# Patient Record
Sex: FEMALE | Race: WHITE | NOT HISPANIC OR LATINO | URBAN - NONMETROPOLITAN AREA
[De-identification: names, ages, dates, MRNs, and addresses within clinical notes are randomized per-mention and may not be internally consistent; named-entity substitution may affect disease eponyms.]

---

## 2021-04-14 ENCOUNTER — WEB ENCOUNTER (OUTPATIENT)
Dept: URBAN - NONMETROPOLITAN AREA CLINIC 2 | Facility: CLINIC | Age: 76
End: 2021-04-14

## 2021-04-14 ENCOUNTER — ERX REFILL RESPONSE (OUTPATIENT)
Dept: URBAN - NONMETROPOLITAN AREA CLINIC 2 | Facility: CLINIC | Age: 76
End: 2021-04-14

## 2021-04-14 RX ORDER — AMITRIPTYLINE HYDROCHLORIDE 25 MG/1
1 TABLET AT BEDTIME TABLET, FILM COATED ORAL ONCE A DAY
Qty: 30 TABLET | Refills: 3

## 2021-04-14 RX ORDER — AMITRIPTYLINE HYDROCHLORIDE 25 MG/1
TAKE ONE TABLET BY MOUTH EVERY NIGHT AT BEDTIME TABLET, FILM COATED ORAL
Qty: 90 | Refills: 0

## 2021-04-30 ENCOUNTER — OFFICE VISIT (OUTPATIENT)
Dept: URBAN - NONMETROPOLITAN AREA CLINIC 13 | Facility: CLINIC | Age: 76
End: 2021-04-30

## 2021-05-11 ENCOUNTER — WEB ENCOUNTER (OUTPATIENT)
Dept: URBAN - NONMETROPOLITAN AREA CLINIC 2 | Facility: CLINIC | Age: 76
End: 2021-05-11

## 2021-05-11 ENCOUNTER — OFFICE VISIT (OUTPATIENT)
Dept: URBAN - NONMETROPOLITAN AREA CLINIC 2 | Facility: CLINIC | Age: 76
End: 2021-05-11
Payer: MEDICARE

## 2021-05-11 VITALS
SYSTOLIC BLOOD PRESSURE: 148 MMHG | DIASTOLIC BLOOD PRESSURE: 74 MMHG | TEMPERATURE: 97.2 F | WEIGHT: 170 LBS | HEIGHT: 62 IN | BODY MASS INDEX: 31.28 KG/M2 | HEART RATE: 71 BPM

## 2021-05-11 DIAGNOSIS — R19.7 DIARRHEA: ICD-10-CM

## 2021-05-11 DIAGNOSIS — Z12.11 COLON CANCER SCREENING: ICD-10-CM

## 2021-05-11 DIAGNOSIS — K57.90 DIVERTICULAR DISEASE: ICD-10-CM

## 2021-05-11 DIAGNOSIS — K21.9 GERD (GASTROESOPHAGEAL REFLUX DISEASE): ICD-10-CM

## 2021-05-11 PROCEDURE — 99214 OFFICE O/P EST MOD 30 MIN: CPT | Performed by: INTERNAL MEDICINE

## 2021-05-11 RX ORDER — METOPROLOL SUCCINATE 100 MG/1
TAKE 1 TABLET (100 MG) BY ORAL ROUTE ONCE DAILY TABLET, EXTENDED RELEASE ORAL 1
Qty: 0 | Refills: 0 | Status: ON HOLD | COMMUNITY
Start: 1900-01-01

## 2021-05-11 RX ORDER — ASPIRIN 81 MG/1
CHEW 1 TABLET (81 MG) BY ORAL ROUTE ONCE DAILY TABLET, CHEWABLE ORAL 1
Qty: 0 | Refills: 0 | Status: ACTIVE | COMMUNITY
Start: 1900-01-01

## 2021-05-11 RX ORDER — METOPROLOL SUCCINATE 200 MG
1 TABLET TABLET, EXTENDED RELEASE 24 HR ORAL ONCE A DAY
Status: ACTIVE | COMMUNITY

## 2021-05-11 RX ORDER — SACCHAROMYCES BOULARDII 250 MG
TAKE 1 CAPSULE BY ORAL ROUTE DAILY CAPSULE ORAL 1
Qty: 0 | Refills: 0 | Status: ACTIVE | COMMUNITY
Start: 1900-01-01

## 2021-05-11 RX ORDER — AMITRIPTYLINE HYDROCHLORIDE 25 MG/1
1 TABLET AT BEDTIME TABLET, FILM COATED ORAL ONCE A DAY
Qty: 30 TABLET | Refills: 3 | Status: ACTIVE | COMMUNITY

## 2021-05-11 RX ORDER — AMLODIPINE BESYLATE 2.5 MG/1
TAKE 1 TABLET (2.5 MG) BY ORAL ROUTE ONCE DAILY TABLET ORAL 1
Qty: 0 | Refills: 0 | Status: ACTIVE | COMMUNITY
Start: 1900-01-01

## 2021-05-11 RX ORDER — METRONIDAZOLE 7.5 MG/G
1 APPLICATION CREAM TOPICAL TWICE A DAY
Qty: 1 TUBE | Refills: 1 | OUTPATIENT
Start: 2021-05-11 | End: 2021-06-08

## 2021-05-11 RX ORDER — CETIRIZINE HYDROCHLORIDE 10 MG/1
TAKE 1 CAPSULE BY ORAL ROUTE ONCE CAPSULE, LIQUID FILLED ORAL 1
Qty: 0 | Refills: 0 | Status: ACTIVE | COMMUNITY
Start: 1900-01-01

## 2021-05-11 RX ORDER — SPIRONOLACTONE 25 MG/1
TAKE 1 TABLET (25 MG) BY ORAL ROUTE ONCE DAILY TABLET ORAL 1
Qty: 0 | Refills: 0 | Status: ACTIVE | COMMUNITY
Start: 1900-01-01

## 2021-05-11 RX ORDER — ROSUVASTATIN CALCIUM 20 MG
TAKE 1 TABLET (20 MG) BY ORAL ROUTE ONCE DAILY TABLET ORAL 1
Qty: 0 | Refills: 0 | Status: ACTIVE | COMMUNITY
Start: 1900-01-01

## 2021-05-11 RX ORDER — BENAZEPRIL HYDROCHLORIDE 40 MG/1
TAKE 1 TABLET (40 MG) BY ORAL ROUTE ONCE DAILY TABLET, FILM COATED ORAL 1
Qty: 0 | Refills: 0 | Status: ACTIVE | COMMUNITY
Start: 1900-01-01

## 2021-05-11 NOTE — HPI-TODAY'S VISIT:
Mrs. Clark presents for evaluation of perineal rash.  A month ago she developed an acute viral enteritis with nausea vomiting diarrhea.  She had some perineal irritation and vaginal yeast infection.  She was given an ointment by her gynecologist for the yeast infection and was told also to rub this on her perineum and perirectal area.  Over the past 2 weeks she has had progressive ulcerations of her perirectal area with bleeding.  This is sore to the touch and erythematous.  Her bowels have improved, she continues to take her Florastor daily and amitriptyline 25 mg nightly with a history of IBS-D.  Her diarrhea has resolved but this perineal inflammation and bleeding is persistent.  MB

## 2021-07-02 ENCOUNTER — WEB ENCOUNTER (OUTPATIENT)
Dept: URBAN - NONMETROPOLITAN AREA CLINIC 13 | Facility: CLINIC | Age: 76
End: 2021-07-02

## 2021-07-02 ENCOUNTER — OFFICE VISIT (OUTPATIENT)
Dept: URBAN - NONMETROPOLITAN AREA CLINIC 13 | Facility: CLINIC | Age: 76
End: 2021-07-02
Payer: MEDICARE

## 2021-07-02 DIAGNOSIS — K57.90 DIVERTICULAR DISEASE: ICD-10-CM

## 2021-07-02 DIAGNOSIS — R19.7 DIARRHEA: ICD-10-CM

## 2021-07-02 DIAGNOSIS — K21.9 GERD (GASTROESOPHAGEAL REFLUX DISEASE): ICD-10-CM

## 2021-07-02 DIAGNOSIS — Z12.11 COLON CANCER SCREENING: ICD-10-CM

## 2021-07-02 PROCEDURE — 99214 OFFICE O/P EST MOD 30 MIN: CPT | Performed by: INTERNAL MEDICINE

## 2021-07-02 RX ORDER — METOPROLOL SUCCINATE 200 MG
1 TABLET TABLET, EXTENDED RELEASE 24 HR ORAL ONCE A DAY
Status: ACTIVE | COMMUNITY

## 2021-07-02 RX ORDER — CETIRIZINE HYDROCHLORIDE 10 MG/1
TAKE 1 CAPSULE BY ORAL ROUTE ONCE CAPSULE, LIQUID FILLED ORAL 1
Qty: 0 | Refills: 0 | Status: ACTIVE | COMMUNITY
Start: 1900-01-01

## 2021-07-02 RX ORDER — ASPIRIN 81 MG/1
CHEW 1 TABLET (81 MG) BY ORAL ROUTE ONCE DAILY TABLET, CHEWABLE ORAL 1
Qty: 0 | Refills: 0 | Status: ACTIVE | COMMUNITY
Start: 1900-01-01

## 2021-07-02 RX ORDER — AMLODIPINE BESYLATE 2.5 MG/1
TAKE 1 TABLET (2.5 MG) BY ORAL ROUTE ONCE DAILY TABLET ORAL 1
Qty: 0 | Refills: 0 | Status: ACTIVE | COMMUNITY
Start: 1900-01-01

## 2021-07-02 RX ORDER — AMITRIPTYLINE HYDROCHLORIDE 25 MG/1
1 TABLET AT BEDTIME TABLET, FILM COATED ORAL ONCE A DAY
Qty: 30 TABLET | Refills: 11

## 2021-07-02 RX ORDER — SPIRONOLACTONE 25 MG/1
TAKE 1 TABLET (25 MG) BY ORAL ROUTE ONCE DAILY TABLET ORAL 1
Qty: 0 | Refills: 0 | Status: ACTIVE | COMMUNITY
Start: 1900-01-01

## 2021-07-02 RX ORDER — METOPROLOL SUCCINATE 100 MG/1
TAKE 1 TABLET (100 MG) BY ORAL ROUTE ONCE DAILY TABLET, EXTENDED RELEASE ORAL 1
Qty: 0 | Refills: 0 | Status: ON HOLD | COMMUNITY
Start: 1900-01-01

## 2021-07-02 RX ORDER — DICYCLOMINE HYDROCHLORIDE 10 MG/1
1 CAPSULE CAPSULE ORAL
Qty: 90 CAPSULE | Refills: 6 | OUTPATIENT
Start: 2021-07-02 | End: 2022-01-27

## 2021-07-02 RX ORDER — ROSUVASTATIN CALCIUM 20 MG
TAKE 1 TABLET (20 MG) BY ORAL ROUTE ONCE DAILY TABLET ORAL 1
Qty: 0 | Refills: 0 | Status: ACTIVE | COMMUNITY
Start: 1900-01-01

## 2021-07-02 RX ORDER — AMITRIPTYLINE HYDROCHLORIDE 25 MG/1
1 TABLET AT BEDTIME TABLET, FILM COATED ORAL ONCE A DAY
Qty: 30 TABLET | Refills: 3 | Status: ACTIVE | COMMUNITY

## 2021-07-02 RX ORDER — BENAZEPRIL HYDROCHLORIDE 40 MG/1
TAKE 1 TABLET (40 MG) BY ORAL ROUTE ONCE DAILY TABLET, FILM COATED ORAL 1
Qty: 0 | Refills: 0 | Status: ACTIVE | COMMUNITY
Start: 1900-01-01

## 2021-07-02 RX ORDER — SACCHAROMYCES BOULARDII 250 MG
TAKE 1 CAPSULE BY ORAL ROUTE DAILY CAPSULE ORAL 1
Qty: 0 | Refills: 0 | Status: ACTIVE | COMMUNITY
Start: 1900-01-01

## 2021-07-02 NOTE — HPI-TODAY'S VISIT:
Mrs. Clark presents for evaluation of perineal rash.  A month ago she developed an acute viral enteritis with nausea vomiting diarrhea.  She had some perineal irritation and vaginal yeast infection.  She was given an ointment by her gynecologist for the yeast infection and was told also to rub this on her perineum and perirectal area.  Over the past 2 weeks she has had progressive ulcerations of her perirectal area with bleeding.  This is sore to the touch and erythematous.  Her bowels have improved, she continues to take her Florastor daily and amitriptyline 25 mg nightly with a history of IBS-D.  Her diarrhea has resolved but this perineal inflammation and bleeding is persistent.  MB 7-2-21 The patient presents today for follow-up of her diarrhea and rash.  Since her last visit, her rash has improved.  Her diarrhea still continues about once every 5 days.  She will wake up almost every morning, and have a normal bowel movement.  On her bad days, she will then have a urgent diarrhea that last up to 10 times.  She will then take 2 Imodium, and then she will usually have normal bowel movements for the next 5 days.  She can usually feel when this sensation is going to come on.  She is taking the amitriptyline at night.  She has never tried dicyclomine.  At this point, we have discussed taking 1-2 dicyclomine every morning to see if this does not help control her diarrhea.  She likely has irritable bowel syndrome with diarrhea in that she did have negative random colon biopsies done in 2018.  She denies any blood in her stool or weight loss.  Overall, she is feeling better today.

## 2021-11-05 ENCOUNTER — OFFICE VISIT (OUTPATIENT)
Dept: URBAN - NONMETROPOLITAN AREA CLINIC 13 | Facility: CLINIC | Age: 76
End: 2021-11-05
Payer: MEDICARE

## 2021-11-05 VITALS
WEIGHT: 174 LBS | BODY MASS INDEX: 32.02 KG/M2 | HEIGHT: 62 IN | DIASTOLIC BLOOD PRESSURE: 83 MMHG | HEART RATE: 60 BPM | SYSTOLIC BLOOD PRESSURE: 154 MMHG

## 2021-11-05 DIAGNOSIS — K57.90 DIVERTICULAR DISEASE: ICD-10-CM

## 2021-11-05 DIAGNOSIS — Z12.11 COLON CANCER SCREENING: ICD-10-CM

## 2021-11-05 DIAGNOSIS — K21.9 GERD (GASTROESOPHAGEAL REFLUX DISEASE): ICD-10-CM

## 2021-11-05 DIAGNOSIS — R19.7 DIARRHEA: ICD-10-CM

## 2021-11-05 PROCEDURE — 99214 OFFICE O/P EST MOD 30 MIN: CPT | Performed by: INTERNAL MEDICINE

## 2021-11-05 RX ORDER — BENAZEPRIL HYDROCHLORIDE 40 MG/1
TAKE 1 TABLET (40 MG) BY ORAL ROUTE ONCE DAILY TABLET, FILM COATED ORAL 1
Qty: 0 | Refills: 0 | Status: ACTIVE | COMMUNITY
Start: 1900-01-01

## 2021-11-05 RX ORDER — AMITRIPTYLINE HYDROCHLORIDE 10 MG/1
1 TABLET AT BEDTIME TABLET, FILM COATED ORAL ONCE A DAY
Qty: 90 TABLET | Refills: 3 | OUTPATIENT
Start: 2021-11-05

## 2021-11-05 RX ORDER — ROSUVASTATIN CALCIUM 20 MG
TAKE 1 TABLET (20 MG) BY ORAL ROUTE ONCE DAILY TABLET ORAL 1
Qty: 0 | Refills: 0 | Status: ACTIVE | COMMUNITY
Start: 1900-01-01

## 2021-11-05 RX ORDER — DICYCLOMINE HYDROCHLORIDE 10 MG/1
1 CAPSULE CAPSULE ORAL
Qty: 90 CAPSULE | Refills: 6 | Status: ACTIVE | COMMUNITY
Start: 2021-07-02 | End: 2022-01-27

## 2021-11-05 RX ORDER — AMLODIPINE BESYLATE 2.5 MG/1
TAKE 1 TABLET (2.5 MG) BY ORAL ROUTE ONCE DAILY TABLET ORAL 1
Qty: 0 | Refills: 0 | Status: ACTIVE | COMMUNITY
Start: 1900-01-01

## 2021-11-05 RX ORDER — CETIRIZINE HYDROCHLORIDE 10 MG/1
TAKE 1 CAPSULE BY ORAL ROUTE ONCE CAPSULE, LIQUID FILLED ORAL 1
Qty: 0 | Refills: 0 | Status: ACTIVE | COMMUNITY
Start: 1900-01-01

## 2021-11-05 RX ORDER — AMITRIPTYLINE HYDROCHLORIDE 25 MG/1
1 TABLET AT BEDTIME TABLET, FILM COATED ORAL ONCE A DAY
Qty: 30 TABLET | Refills: 11 | Status: ACTIVE | COMMUNITY

## 2021-11-05 RX ORDER — ASPIRIN 81 MG/1
CHEW 1 TABLET (81 MG) BY ORAL ROUTE ONCE DAILY TABLET, CHEWABLE ORAL 1
Qty: 0 | Refills: 0 | Status: ACTIVE | COMMUNITY
Start: 1900-01-01

## 2021-11-05 RX ORDER — METOPROLOL SUCCINATE 200 MG
1 TABLET TABLET, EXTENDED RELEASE 24 HR ORAL ONCE A DAY
Status: ACTIVE | COMMUNITY

## 2021-11-05 RX ORDER — METOPROLOL SUCCINATE 100 MG/1
TAKE 1 TABLET (100 MG) BY ORAL ROUTE ONCE DAILY TABLET, EXTENDED RELEASE ORAL 1
Qty: 0 | Refills: 0 | Status: ON HOLD | COMMUNITY
Start: 1900-01-01

## 2021-11-05 RX ORDER — SACCHAROMYCES BOULARDII 250 MG
TAKE 1 CAPSULE BY ORAL ROUTE DAILY CAPSULE ORAL 1
Qty: 0 | Refills: 0 | Status: ACTIVE | COMMUNITY
Start: 1900-01-01

## 2021-11-05 RX ORDER — SPIRONOLACTONE 25 MG/1
TAKE 1 TABLET (25 MG) BY ORAL ROUTE ONCE DAILY TABLET ORAL 1
Qty: 0 | Refills: 0 | Status: ACTIVE | COMMUNITY
Start: 1900-01-01

## 2021-11-05 NOTE — HPI-TODAY'S VISIT:
Mrs. Clark presents for evaluation of perineal rash.  A month ago she developed an acute viral enteritis with nausea vomiting diarrhea.  She had some perineal irritation and vaginal yeast infection.  She was given an ointment by her gynecologist for the yeast infection and was told also to rub this on her perineum and perirectal area.  Over the past 2 weeks she has had progressive ulcerations of her perirectal area with bleeding.  This is sore to the touch and erythematous.  Her bowels have improved, she continues to take her Florastor daily and amitriptyline 25 mg nightly with a history of IBS-D.  Her diarrhea has resolved but this perineal inflammation and bleeding is persistent.  MB 7-2-21 The patient presents today for follow-up of her diarrhea and rash.  Since her last visit, her rash has improved.  Her diarrhea still continues about once every 5 days.  She will wake up almost every morning, and have a normal bowel movement.  On her bad days, she will then have a urgent diarrhea that last up to 10 times.  She will then take 2 Imodium, and then she will usually have normal bowel movements for the next 5 days.  She can usually feel when this sensation is going to come on.  She is taking the amitriptyline at night.  She has never tried dicyclomine.  At this point, we have discussed taking 1-2 dicyclomine every morning to see if this does not help control her diarrhea.  She likely has irritable bowel syndrome with diarrhea in that she did have negative random colon biopsies done in 2018.  She denies any blood in her stool or weight loss.  Overall, she is feeling better today. 11/5/2021 The patient presents today for follow-up of her diarrhea.  She has improved after taking dicyclomine 1:59 in the morning.  She is also on amitriptyline at night.  She has not added in Metamucil at this time.  She continues to have occasional episodes of explosive diarrhea after being more constipated for about 10 days.  At this point, I want her to continue her dicyclomine in the morning her amitriptyline at night, and add Metamucil at night as well.  Overall, she is doing quite well.  She is off medications for her reflux.

## 2021-11-08 ENCOUNTER — WEB ENCOUNTER (OUTPATIENT)
Dept: URBAN - NONMETROPOLITAN AREA CLINIC 2 | Facility: CLINIC | Age: 76
End: 2021-11-08

## 2021-11-08 RX ORDER — AMITRIPTYLINE HYDROCHLORIDE 25 MG/1
1 TABLET AT BEDTIME TABLET, FILM COATED ORAL ONCE A DAY
Qty: 90 TABLET | Refills: 3

## 2022-05-06 ENCOUNTER — OFFICE VISIT (OUTPATIENT)
Dept: URBAN - NONMETROPOLITAN AREA CLINIC 2 | Facility: CLINIC | Age: 77
End: 2022-05-06
Payer: MEDICARE

## 2022-05-06 DIAGNOSIS — K57.90 DIVERTICULAR DISEASE: ICD-10-CM

## 2022-05-06 DIAGNOSIS — Z12.11 COLON CANCER SCREENING: ICD-10-CM

## 2022-05-06 DIAGNOSIS — R19.7 DIARRHEA: ICD-10-CM

## 2022-05-06 DIAGNOSIS — K21.9 GERD (GASTROESOPHAGEAL REFLUX DISEASE): ICD-10-CM

## 2022-05-06 PROCEDURE — 99213 OFFICE O/P EST LOW 20 MIN: CPT | Performed by: INTERNAL MEDICINE

## 2022-05-06 RX ORDER — SACCHAROMYCES BOULARDII 250 MG
TAKE 1 CAPSULE BY ORAL ROUTE DAILY CAPSULE ORAL 1
Qty: 0 | Refills: 0 | Status: ACTIVE | COMMUNITY
Start: 1900-01-01

## 2022-05-06 RX ORDER — BENAZEPRIL HYDROCHLORIDE 40 MG/1
TAKE 1 TABLET (40 MG) BY ORAL ROUTE ONCE DAILY TABLET, FILM COATED ORAL 1
Qty: 0 | Refills: 0 | Status: ACTIVE | COMMUNITY
Start: 1900-01-01

## 2022-05-06 RX ORDER — CETIRIZINE HYDROCHLORIDE 10 MG/1
TAKE 1 CAPSULE BY ORAL ROUTE ONCE CAPSULE, LIQUID FILLED ORAL 1
Qty: 0 | Refills: 0 | Status: ACTIVE | COMMUNITY
Start: 1900-01-01

## 2022-05-06 RX ORDER — METOPROLOL SUCCINATE 200 MG
1 TABLET TABLET, EXTENDED RELEASE 24 HR ORAL ONCE A DAY
Status: ACTIVE | COMMUNITY

## 2022-05-06 RX ORDER — AMITRIPTYLINE HYDROCHLORIDE 10 MG/1
1 TABLET AT BEDTIME TABLET, FILM COATED ORAL ONCE A DAY
Qty: 90 TABLET | Refills: 3 | Status: ACTIVE | COMMUNITY
Start: 2021-11-05

## 2022-05-06 RX ORDER — SPIRONOLACTONE 50 MG/1
TAKE 1 TABLET (25 MG) BY ORAL ROUTE ONCE DAILY TABLET, FILM COATED ORAL 1
Qty: 0 | Refills: 0 | Status: ACTIVE | COMMUNITY
Start: 1900-01-01

## 2022-05-06 RX ORDER — ASPIRIN 81 MG/1
CHEW 1 TABLET (81 MG) BY ORAL ROUTE ONCE DAILY TABLET, CHEWABLE ORAL 1
Qty: 0 | Refills: 0 | Status: ACTIVE | COMMUNITY
Start: 1900-01-01

## 2022-05-06 RX ORDER — AMITRIPTYLINE HYDROCHLORIDE 25 MG/1
1 TABLET AT BEDTIME TABLET, FILM COATED ORAL ONCE A DAY
Qty: 90 TABLET | Refills: 3 | Status: ACTIVE | COMMUNITY

## 2022-05-06 RX ORDER — ROSUVASTATIN CALCIUM 20 MG
TAKE 1 TABLET (20 MG) BY ORAL ROUTE ONCE DAILY TABLET ORAL 1
Qty: 0 | Refills: 0 | Status: ACTIVE | COMMUNITY
Start: 1900-01-01

## 2022-05-06 RX ORDER — AMLODIPINE BESYLATE 2.5 MG/1
TAKE 1 TABLET (2.5 MG) BY ORAL ROUTE ONCE DAILY TABLET ORAL 1
Qty: 0 | Refills: 0 | Status: ACTIVE | COMMUNITY
Start: 1900-01-01

## 2022-05-06 NOTE — HPI-TODAY'S VISIT:
Mrs. Clark presents for evaluation of perineal rash.  A month ago she developed an acute viral enteritis with nausea vomiting diarrhea.  She had some perineal irritation and vaginal yeast infection.  She was given an ointment by her gynecologist for the yeast infection and was told also to rub this on her perineum and perirectal area.  Over the past 2 weeks she has had progressive ulcerations of her perirectal area with bleeding.  This is sore to the touch and erythematous.  Her bowels have improved, she continues to take her Florastor daily and amitriptyline 25 mg nightly with a history of IBS-D.  Her diarrhea has resolved but this perineal inflammation and bleeding is persistent.  MB 7-2-21 The patient presents today for follow-up of her diarrhea and rash.  Since her last visit, her rash has improved.  Her diarrhea still continues about once every 5 days.  She will wake up almost every morning, and have a normal bowel movement.  On her bad days, she will then have a urgent diarrhea that last up to 10 times.  She will then take 2 Imodium, and then she will usually have normal bowel movements for the next 5 days.  She can usually feel when this sensation is going to come on.  She is taking the amitriptyline at night.  She has never tried dicyclomine.  At this point, we have discussed taking 1-2 dicyclomine every morning to see if this does not help control her diarrhea.  She likely has irritable bowel syndrome with diarrhea in that she did have negative random colon biopsies done in 2018.  She denies any blood in her stool or weight loss.  Overall, she is feeling better today. 11/5/2021 The patient presents today for follow-up of her diarrhea.  She has improved after taking dicyclomine 1:59 in the morning.  She is also on amitriptyline at night.  She has not added in Metamucil at this time.  She continues to have occasional episodes of explosive diarrhea after being more constipated for about 10 days.  At this point, I want her to continue her dicyclomine in the morning her amitriptyline at night, and add Metamucil at night as well.  Overall, she is doing quite well.  She is off medications for her reflux. 5/6/2022 The patient presents today for follow-up of her IBS with diarrhea.  Since her last visit, she has been doing well.  She takes dicyclomine twice a day and amitriptyline at night.  She also uses Imodium as needed.  On this regimen, her bowels are well controlled.  She has not added in Metamucil, but she is going to try to do this as well.  She is due for repeat colonoscopy next year given her family history of colon cancer.  We will see her back in the office in 1 year.

## 2022-07-15 ENCOUNTER — TELEPHONE ENCOUNTER (OUTPATIENT)
Dept: URBAN - NONMETROPOLITAN AREA CLINIC 2 | Facility: CLINIC | Age: 77
End: 2022-07-15

## 2022-07-15 RX ORDER — DICYCLOMINE HYDROCHLORIDE 10 MG/1
TAKE 1 CAPSULE BY ORAL ROUTE 2 TIMES A DAY  BEFORE MEALS AS NEEDED FOR DIARRHEA/URGENCY CAPSULE ORAL THREE TIMES A DAY
Qty: 60 CAPSULE | Refills: 11
Start: 2021-07-02 | End: 2023-07-13

## 2022-08-05 ENCOUNTER — OFFICE VISIT (OUTPATIENT)
Dept: URBAN - NONMETROPOLITAN AREA CLINIC 2 | Facility: CLINIC | Age: 77
End: 2022-08-05
Payer: MEDICARE

## 2022-08-05 ENCOUNTER — LAB OUTSIDE AN ENCOUNTER (OUTPATIENT)
Dept: URBAN - NONMETROPOLITAN AREA CLINIC 2 | Facility: CLINIC | Age: 77
End: 2022-08-05

## 2022-08-05 VITALS
BODY MASS INDEX: 31.83 KG/M2 | DIASTOLIC BLOOD PRESSURE: 94 MMHG | TEMPERATURE: 97.2 F | HEIGHT: 62 IN | SYSTOLIC BLOOD PRESSURE: 145 MMHG | HEART RATE: 91 BPM | WEIGHT: 173 LBS

## 2022-08-05 DIAGNOSIS — R19.7 DIARRHEA: ICD-10-CM

## 2022-08-05 DIAGNOSIS — R13.19 ESOPHAGEAL DYSPHAGIA: ICD-10-CM

## 2022-08-05 DIAGNOSIS — Z12.11 COLON CANCER SCREENING: ICD-10-CM

## 2022-08-05 DIAGNOSIS — K21.9 GERD (GASTROESOPHAGEAL REFLUX DISEASE): ICD-10-CM

## 2022-08-05 DIAGNOSIS — K57.90 DIVERTICULAR DISEASE: ICD-10-CM

## 2022-08-05 PROCEDURE — 99214 OFFICE O/P EST MOD 30 MIN: CPT | Performed by: INTERNAL MEDICINE

## 2022-08-05 RX ORDER — DICYCLOMINE HYDROCHLORIDE 10 MG/1
1 CAPSULE CAPSULE ORAL THREE TIMES A DAY
Qty: 270 CAPSULE | Refills: 6 | OUTPATIENT
Start: 2022-08-05 | End: 2024-04-26

## 2022-08-05 RX ORDER — CETIRIZINE HYDROCHLORIDE 10 MG/1
TAKE 1 CAPSULE BY ORAL ROUTE ONCE CAPSULE, LIQUID FILLED ORAL 1
Qty: 0 | Refills: 0 | Status: ACTIVE | COMMUNITY
Start: 1900-01-01

## 2022-08-05 RX ORDER — PANTOPRAZOLE SODIUM 40 MG/1
1 TABLET TABLET, DELAYED RELEASE ORAL ONCE A DAY
Qty: 90 TABLET | Refills: 3 | OUTPATIENT
Start: 2022-08-05

## 2022-08-05 RX ORDER — BENAZEPRIL HYDROCHLORIDE 40 MG/1
TAKE 1 TABLET (40 MG) BY ORAL ROUTE ONCE DAILY TABLET, FILM COATED ORAL 1
Qty: 0 | Refills: 0 | Status: ACTIVE | COMMUNITY
Start: 1900-01-01

## 2022-08-05 RX ORDER — ROSUVASTATIN CALCIUM 20 MG
TAKE 1 TABLET (20 MG) BY ORAL ROUTE ONCE DAILY TABLET ORAL 1
Qty: 0 | Refills: 0 | Status: ACTIVE | COMMUNITY
Start: 1900-01-01

## 2022-08-05 RX ORDER — METOPROLOL SUCCINATE 200 MG
1 TABLET TABLET, EXTENDED RELEASE 24 HR ORAL ONCE A DAY
Status: ACTIVE | COMMUNITY

## 2022-08-05 RX ORDER — AMLODIPINE BESYLATE 2.5 MG/1
TAKE 1 TABLET (2.5 MG) BY ORAL ROUTE ONCE DAILY TABLET ORAL 1
Qty: 0 | Refills: 0 | Status: ACTIVE | COMMUNITY
Start: 1900-01-01

## 2022-08-05 RX ORDER — SPIRONOLACTONE 50 MG/1
TAKE 1 TABLET (25 MG) BY ORAL ROUTE ONCE DAILY TABLET, FILM COATED ORAL 1
Qty: 0 | Refills: 0 | Status: ACTIVE | COMMUNITY
Start: 1900-01-01

## 2022-08-05 RX ORDER — DICYCLOMINE HYDROCHLORIDE 10 MG/1
TAKE 1 CAPSULE BY ORAL ROUTE 2 TIMES A DAY  BEFORE MEALS AS NEEDED FOR DIARRHEA/URGENCY CAPSULE ORAL THREE TIMES A DAY
Qty: 60 CAPSULE | Refills: 11 | Status: ACTIVE | COMMUNITY
Start: 2021-07-02 | End: 2023-07-13

## 2022-08-05 NOTE — HPI-TODAY'S VISIT:
Mrs. Clark presents for evaluation of perineal rash.  A month ago she developed an acute viral enteritis with nausea vomiting diarrhea.  She had some perineal irritation and vaginal yeast infection.  She was given an ointment by her gynecologist for the yeast infection and was told also to rub this on her perineum and perirectal area.  Over the past 2 weeks she has had progressive ulcerations of her perirectal area with bleeding.  This is sore to the touch and erythematous.  Her bowels have improved, she continues to take her Florastor daily and amitriptyline 25 mg nightly with a history of IBS-D.  Her diarrhea has resolved but this perineal inflammation and bleeding is persistent.  MB 7-2-21 The patient presents today for follow-up of her diarrhea and rash.  Since her last visit, her rash has improved.  Her diarrhea still continues about once every 5 days.  She will wake up almost every morning, and have a normal bowel movement.  On her bad days, she will then have a urgent diarrhea that last up to 10 times.  She will then take 2 Imodium, and then she will usually have normal bowel movements for the next 5 days.  She can usually feel when this sensation is going to come on.  She is taking the amitriptyline at night.  She has never tried dicyclomine.  At this point, we have discussed taking 1-2 dicyclomine every morning to see if this does not help control her diarrhea.  She likely has irritable bowel syndrome with diarrhea in that she did have negative random colon biopsies done in 2018.  She denies any blood in her stool or weight loss.  Overall, she is feeling better today. 11/5/2021 The patient presents today for follow-up of her diarrhea.  She has improved after taking dicyclomine 1:59 in the morning.  She is also on amitriptyline at night.  She has not added in Metamucil at this time.  She continues to have occasional episodes of explosive diarrhea after being more constipated for about 10 days.  At this point, I want her to continue her dicyclomine in the morning her amitriptyline at night, and add Metamucil at night as well.  Overall, she is doing quite well.  She is off medications for her reflux. 5/6/2022 The patient presents today for follow-up of her IBS with diarrhea.  Since her last visit, she has been doing well.  She takes dicyclomine twice a day and amitriptyline at night.  She also uses Imodium as needed.  On this regimen, her bowels are well controlled.  She has not added in Metamucil, but she is going to try to do this as well.  She is due for repeat colonoscopy next year given her family history of colon cancer.  We will see her back in the office in 1 year. 8/5/2022 the patient presents today for follow-up of her IBS with diarrhea.  Her main issue today, is that she has new onset dysphagia.  She states about 10 years ago, she had a similar problem.  She actually had a food bolus, and Dr. Centeno did an EGD with dilation and food bolus removal.  She has been taking 1 over-the-counter Prevacid since that time.  She has been having worsening symptoms of her reflux.  She feels burning and acid coming up into her esophagus throughout the day and at night.  Her diarrhea has been worse as well.  When she got her last prescription for dicyclomine, it was only for once a day.  She has had to take Imodium several times this week where she usually only has to take it once a month.  She did add in the Metamucil, but she is unsure if this is helped significantly.  Today, we have discussed switching her PPI to pantoprazole 40 mg daily.  We are going to schedule her for an EGD with dilation.  I want her to go back on her dicyclomine twice a day.  We will see her back in the office after her EGD for further evaluation.

## 2023-01-23 ENCOUNTER — ERX REFILL RESPONSE (OUTPATIENT)
Dept: URBAN - NONMETROPOLITAN AREA CLINIC 2 | Facility: CLINIC | Age: 78
End: 2023-01-23

## 2023-01-23 RX ORDER — AMITRIPTYLINE HYDROCHLORIDE 25 MG/1
TAKE ONE TABLET BY MOUTH EVERY NIGHT AT BEDTIME TABLET, FILM COATED ORAL
Qty: 90 TABLET | Refills: 0 | OUTPATIENT

## 2023-01-23 RX ORDER — AMITRIPTYLINE HYDROCHLORIDE 25 MG/1
TAKE ONE TABLET BY MOUTH EVERY NIGHT AT BEDTIME TABLET, FILM COATED ORAL
Qty: 90 TABLET | Refills: 3 | OUTPATIENT

## 2023-05-12 ENCOUNTER — OFFICE VISIT (OUTPATIENT)
Dept: URBAN - NONMETROPOLITAN AREA CLINIC 13 | Facility: CLINIC | Age: 78
End: 2023-05-12
Payer: MEDICARE

## 2023-05-12 ENCOUNTER — LAB OUTSIDE AN ENCOUNTER (OUTPATIENT)
Dept: URBAN - NONMETROPOLITAN AREA CLINIC 13 | Facility: CLINIC | Age: 78
End: 2023-05-12

## 2023-05-12 VITALS
HEIGHT: 62 IN | BODY MASS INDEX: 31.28 KG/M2 | HEART RATE: 88 BPM | SYSTOLIC BLOOD PRESSURE: 137 MMHG | WEIGHT: 170 LBS | DIASTOLIC BLOOD PRESSURE: 90 MMHG

## 2023-05-12 DIAGNOSIS — R13.19 ESOPHAGEAL DYSPHAGIA: ICD-10-CM

## 2023-05-12 DIAGNOSIS — K21.9 GERD (GASTROESOPHAGEAL REFLUX DISEASE): ICD-10-CM

## 2023-05-12 DIAGNOSIS — R19.7 DIARRHEA: ICD-10-CM

## 2023-05-12 DIAGNOSIS — Z12.11 COLON CANCER SCREENING: ICD-10-CM

## 2023-05-12 PROBLEM — 40890009: Status: ACTIVE | Noted: 2022-08-05

## 2023-05-12 PROCEDURE — 99214 OFFICE O/P EST MOD 30 MIN: CPT

## 2023-05-12 RX ORDER — DICYCLOMINE HYDROCHLORIDE 10 MG/1
1 CAPSULE CAPSULE ORAL THREE TIMES A DAY
Qty: 270 CAPSULE | Refills: 6 | OUTPATIENT

## 2023-05-12 RX ORDER — ROSUVASTATIN CALCIUM 20 MG
TAKE 1 TABLET (20 MG) BY ORAL ROUTE ONCE DAILY TABLET ORAL 1
Qty: 0 | Refills: 0 | Status: ACTIVE | COMMUNITY
Start: 1900-01-01

## 2023-05-12 RX ORDER — BENAZEPRIL HYDROCHLORIDE 40 MG/1
TAKE 1 TABLET (40 MG) BY ORAL ROUTE ONCE DAILY TABLET, FILM COATED ORAL 1
Qty: 0 | Refills: 0 | Status: ACTIVE | COMMUNITY
Start: 1900-01-01

## 2023-05-12 RX ORDER — CETIRIZINE HYDROCHLORIDE 10 MG/1
TAKE 1 CAPSULE BY ORAL ROUTE ONCE CAPSULE, LIQUID FILLED ORAL 1
Qty: 0 | Refills: 0 | Status: ACTIVE | COMMUNITY
Start: 1900-01-01

## 2023-05-12 RX ORDER — DICYCLOMINE HYDROCHLORIDE 10 MG/1
1 CAPSULE CAPSULE ORAL THREE TIMES A DAY
Qty: 270 CAPSULE | Refills: 6 | Status: ACTIVE | COMMUNITY
Start: 2022-08-05 | End: 2024-04-26

## 2023-05-12 RX ORDER — PANTOPRAZOLE SODIUM 40 MG/1
1 TABLET TABLET, DELAYED RELEASE ORAL ONCE A DAY
Qty: 90 TABLET | Refills: 3 | OUTPATIENT

## 2023-05-12 RX ORDER — PANTOPRAZOLE SODIUM 40 MG/1
1 TABLET TABLET, DELAYED RELEASE ORAL ONCE A DAY
Qty: 90 TABLET | Refills: 3 | Status: ACTIVE | COMMUNITY
Start: 2022-08-05

## 2023-05-12 RX ORDER — AMITRIPTYLINE HYDROCHLORIDE 25 MG/1
TAKE ONE TABLET BY MOUTH EVERY NIGHT AT BEDTIME TABLET, FILM COATED ORAL
Qty: 90 TABLET | Refills: 3 | Status: ACTIVE | COMMUNITY

## 2023-05-12 RX ORDER — METOPROLOL SUCCINATE 200 MG
1 TABLET TABLET, EXTENDED RELEASE 24 HR ORAL ONCE A DAY
Status: ACTIVE | COMMUNITY

## 2023-05-12 RX ORDER — DICYCLOMINE HYDROCHLORIDE 10 MG/1
TAKE 1 CAPSULE BY ORAL ROUTE 2 TIMES A DAY  BEFORE MEALS AS NEEDED FOR DIARRHEA/URGENCY CAPSULE ORAL THREE TIMES A DAY
Qty: 60 CAPSULE | Refills: 11 | Status: ACTIVE | COMMUNITY
Start: 2021-07-02 | End: 2023-07-13

## 2023-05-12 RX ORDER — POLYETHYLENE GLYCOL 3350, SODIUM SULFATE, SODIUM CHLORIDE, POTASSIUM CHLORIDE, ASCORBIC ACID, SODIUM ASCORBATE 140-9-5.2G
1000 ML KIT ORAL ONCE
Qty: 1 KIT | Refills: 0 | OUTPATIENT
Start: 2023-05-12 | End: 2023-05-13

## 2023-05-12 RX ORDER — AMLODIPINE BESYLATE 2.5 MG/1
TAKE 1 TABLET (2.5 MG) BY ORAL ROUTE ONCE DAILY TABLET ORAL 1
Qty: 0 | Refills: 0 | Status: ACTIVE | COMMUNITY
Start: 1900-01-01

## 2023-05-12 RX ORDER — SPIRONOLACTONE 50 MG/1
TAKE 1 TABLET (25 MG) BY ORAL ROUTE ONCE DAILY TABLET, FILM COATED ORAL 1
Qty: 0 | Refills: 0 | Status: ACTIVE | COMMUNITY
Start: 1900-01-01

## 2023-05-12 NOTE — HPI-TODAY'S VISIT:
Mrs. Clark presents for evaluation of perineal rash.  A month ago she developed an acute viral enteritis with nausea vomiting diarrhea.  She had some perineal irritation and vaginal yeast infection.  She was given an ointment by her gynecologist for the yeast infection and was told also to rub this on her perineum and perirectal area.  Over the past 2 weeks she has had progressive ulcerations of her perirectal area with bleeding.  This is sore to the touch and erythematous.  Her bowels have improved, she continues to take her Florastor daily and amitriptyline 25 mg nightly with a history of IBS-D.  Her diarrhea has resolved but this perineal inflammation and bleeding is persistent.  MB 7-2-21 The patient presents today for follow-up of her diarrhea and rash.  Since her last visit, her rash has improved.  Her diarrhea still continues about once every 5 days.  She will wake up almost every morning, and have a normal bowel movement.  On her bad days, she will then have a urgent diarrhea that last up to 10 times.  She will then take 2 Imodium, and then she will usually have normal bowel movements for the next 5 days.  She can usually feel when this sensation is going to come on.  She is taking the amitriptyline at night.  She has never tried dicyclomine.  At this point, we have discussed taking 1-2 dicyclomine every morning to see if this does not help control her diarrhea.  She likely has irritable bowel syndrome with diarrhea in that she did have negative random colon biopsies done in 2018.  She denies any blood in her stool or weight loss.  Overall, she is feeling better today. 11/5/2021 The patient presents today for follow-up of her diarrhea.  She has improved after taking dicyclomine 1:59 in the morning.  She is also on amitriptyline at night.  She has not added in Metamucil at this time.  She continues to have occasional episodes of explosive diarrhea after being more constipated for about 10 days.  At this point, I want her to continue her dicyclomine in the morning her amitriptyline at night, and add Metamucil at night as well.  Overall, she is doing quite well.  She is off medications for her reflux. 5/6/2022 The patient presents today for follow-up of her IBS with diarrhea.  Since her last visit, she has been doing well.  She takes dicyclomine twice a day and amitriptyline at night.  She also uses Imodium as needed.  On this regimen, her bowels are well controlled.  She has not added in Metamucil, but she is going to try to do this as well.  She is due for repeat colonoscopy next year given her family history of colon cancer.  We will see her back in the office in 1 year. 8/5/2022 the patient presents today for follow-up of her IBS with diarrhea.  Her main issue today, is that she has new onset dysphagia.  She states about 10 years ago, she had a similar problem.  She actually had a food bolus, and Dr. Centeno did an EGD with dilation and food bolus removal.  She has been taking 1 over-the-counter Prevacid since that time.  She has been having worsening symptoms of her reflux.  She feels burning and acid coming up into her esophagus throughout the day and at night.  Her diarrhea has been worse as well.  When she got her last prescription for dicyclomine, it was only for once a day.  She has had to take Imodium several times this week where she usually only has to take it once a month.  She did add in the Metamucil, but she is unsure if this is helped significantly.  Today, we have discussed switching her PPI to pantoprazole 40 mg daily.  We are going to schedule her for an EGD with dilation.  I want her to go back on her dicyclomine twice a day.  We will see her back in the office after her EGD for further evaluation.  5/12/23 Mrs. Clark returns to clinic for follow up of IBS with diarrhea and dysphagia. She states much improved on protonix, therfore she cancelled her EGD. She has no return of dysphagia.  Continues with 3-4 urgent bms on loose days takes imodium 1 x per week giving her a normal 1 per day BM but she needs to repeat again in a week.  Continues drinking wine daily. She is on Xarelto followed by Dr. Mendoza. States florastor didn't work, but agrees to increase to 2 fiber tablets from one SP

## 2023-07-06 ENCOUNTER — TELEPHONE ENCOUNTER (OUTPATIENT)
Dept: URBAN - NONMETROPOLITAN AREA CLINIC 2 | Facility: CLINIC | Age: 78
End: 2023-07-06

## 2023-09-28 ENCOUNTER — OFFICE VISIT (OUTPATIENT)
Dept: URBAN - METROPOLITAN AREA MEDICAL CENTER 1 | Facility: MEDICAL CENTER | Age: 78
End: 2023-09-28
Payer: MEDICARE

## 2023-09-28 ENCOUNTER — LAB OUTSIDE AN ENCOUNTER (OUTPATIENT)
Dept: URBAN - NONMETROPOLITAN AREA CLINIC 2 | Facility: CLINIC | Age: 78
End: 2023-09-28

## 2023-09-28 DIAGNOSIS — R19.7 ACUTE DIARRHEA: ICD-10-CM

## 2023-09-28 DIAGNOSIS — Z80.0 BROTHER AT YOUNG AGE FAMILY HISTORY OF COLON CANCER: ICD-10-CM

## 2023-09-28 PROCEDURE — 45380 COLONOSCOPY AND BIOPSY: CPT | Performed by: INTERNAL MEDICINE

## 2023-09-28 RX ORDER — ROSUVASTATIN CALCIUM 20 MG
TAKE 1 TABLET (20 MG) BY ORAL ROUTE ONCE DAILY TABLET ORAL 1
Qty: 0 | Refills: 0 | Status: ACTIVE | COMMUNITY
Start: 1900-01-01

## 2023-09-28 RX ORDER — AMITRIPTYLINE HYDROCHLORIDE 25 MG/1
TAKE ONE TABLET BY MOUTH EVERY NIGHT AT BEDTIME TABLET, FILM COATED ORAL
Qty: 90 TABLET | Refills: 3 | Status: ACTIVE | COMMUNITY

## 2023-09-28 RX ORDER — BENAZEPRIL HYDROCHLORIDE 40 MG/1
TAKE 1 TABLET (40 MG) BY ORAL ROUTE ONCE DAILY TABLET, FILM COATED ORAL 1
Qty: 0 | Refills: 0 | Status: ACTIVE | COMMUNITY
Start: 1900-01-01

## 2023-09-28 RX ORDER — AMLODIPINE BESYLATE 2.5 MG/1
TAKE 1 TABLET (2.5 MG) BY ORAL ROUTE ONCE DAILY TABLET ORAL 1
Qty: 0 | Refills: 0 | Status: ACTIVE | COMMUNITY
Start: 1900-01-01

## 2023-09-28 RX ORDER — CETIRIZINE HYDROCHLORIDE 10 MG/1
TAKE 1 CAPSULE BY ORAL ROUTE ONCE CAPSULE, LIQUID FILLED ORAL 1
Qty: 0 | Refills: 0 | Status: ACTIVE | COMMUNITY
Start: 1900-01-01

## 2023-09-28 RX ORDER — DICYCLOMINE HYDROCHLORIDE 10 MG/1
1 CAPSULE CAPSULE ORAL THREE TIMES A DAY
Qty: 270 CAPSULE | Refills: 6 | Status: ACTIVE | COMMUNITY

## 2023-09-28 RX ORDER — SPIRONOLACTONE 50 MG/1
TAKE 1 TABLET (25 MG) BY ORAL ROUTE ONCE DAILY TABLET, FILM COATED ORAL 1
Qty: 0 | Refills: 0 | Status: ACTIVE | COMMUNITY
Start: 1900-01-01

## 2023-09-28 RX ORDER — PANTOPRAZOLE SODIUM 40 MG/1
1 TABLET TABLET, DELAYED RELEASE ORAL ONCE A DAY
Qty: 90 TABLET | Refills: 3 | Status: ACTIVE | COMMUNITY

## 2023-09-28 RX ORDER — METOPROLOL SUCCINATE 200 MG
1 TABLET TABLET, EXTENDED RELEASE 24 HR ORAL ONCE A DAY
Status: ACTIVE | COMMUNITY

## 2023-09-29 LAB
AP CASE REPORT: (no result)
AP FINAL DIAGNOSIS: (no result)
AP GROSS DESCRIPTION: (no result)
AP MICROSCOPIC DESCRIPTION: (no result)

## 2023-10-22 ENCOUNTER — P2P PATIENT RECORD (OUTPATIENT)
Age: 78
End: 2023-10-22

## 2024-01-10 ENCOUNTER — OFFICE VISIT (OUTPATIENT)
Dept: URBAN - NONMETROPOLITAN AREA CLINIC 13 | Facility: CLINIC | Age: 79
End: 2024-01-10

## 2024-01-17 ENCOUNTER — ERX REFILL RESPONSE (OUTPATIENT)
Dept: URBAN - NONMETROPOLITAN AREA CLINIC 2 | Facility: CLINIC | Age: 79
End: 2024-01-17

## 2024-01-17 RX ORDER — AMITRIPTYLINE HYDROCHLORIDE 25 MG/1
TAKE ONE TABLET BY MOUTH EVERY NIGHT AT BEDTIME TABLET, FILM COATED ORAL
Qty: 90 TABLET | Refills: 3 | OUTPATIENT

## 2024-01-21 ENCOUNTER — P2P PATIENT RECORD (OUTPATIENT)
Age: 79
End: 2024-01-21

## 2024-02-02 ENCOUNTER — OV EP (OUTPATIENT)
Dept: URBAN - NONMETROPOLITAN AREA CLINIC 13 | Facility: CLINIC | Age: 79
End: 2024-02-02
Payer: MEDICARE

## 2024-02-02 VITALS
HEIGHT: 62 IN | SYSTOLIC BLOOD PRESSURE: 128 MMHG | DIASTOLIC BLOOD PRESSURE: 88 MMHG | HEART RATE: 70 BPM | BODY MASS INDEX: 31.43 KG/M2 | WEIGHT: 170.8 LBS

## 2024-02-02 DIAGNOSIS — K21.9 GERD (GASTROESOPHAGEAL REFLUX DISEASE): ICD-10-CM

## 2024-02-02 DIAGNOSIS — Z12.11 COLON CANCER SCREENING: ICD-10-CM

## 2024-02-02 DIAGNOSIS — K57.90 DIVERTICULAR DISEASE: ICD-10-CM

## 2024-02-02 DIAGNOSIS — R19.7 DIARRHEA: ICD-10-CM

## 2024-02-02 DIAGNOSIS — R13.19 ESOPHAGEAL DYSPHAGIA: ICD-10-CM

## 2024-02-02 PROCEDURE — 99213 OFFICE O/P EST LOW 20 MIN: CPT

## 2024-02-02 RX ORDER — PANTOPRAZOLE SODIUM 40 MG/1
1 TABLET TABLET, DELAYED RELEASE ORAL ONCE A DAY
Qty: 90 TABLET | Refills: 3 | Status: ACTIVE | COMMUNITY

## 2024-02-02 RX ORDER — AMLODIPINE BESYLATE 2.5 MG/1
TAKE 1 TABLET (2.5 MG) BY ORAL ROUTE ONCE DAILY TABLET ORAL 1
Qty: 0 | Refills: 0 | Status: ACTIVE | COMMUNITY
Start: 1900-01-01

## 2024-02-02 RX ORDER — METOPROLOL SUCCINATE 200 MG
1 TABLET TABLET, EXTENDED RELEASE 24 HR ORAL ONCE A DAY
Status: ACTIVE | COMMUNITY

## 2024-02-02 RX ORDER — SPIRONOLACTONE 50 MG/1
TAKE 1 TABLET (25 MG) BY ORAL ROUTE ONCE DAILY TABLET, FILM COATED ORAL 1
Qty: 0 | Refills: 0 | Status: ACTIVE | COMMUNITY
Start: 1900-01-01

## 2024-02-02 RX ORDER — CETIRIZINE HYDROCHLORIDE 10 MG/1
TAKE 1 CAPSULE BY ORAL ROUTE ONCE CAPSULE, LIQUID FILLED ORAL 1
Qty: 0 | Refills: 0 | Status: ACTIVE | COMMUNITY
Start: 1900-01-01

## 2024-02-02 RX ORDER — BENAZEPRIL HYDROCHLORIDE 40 MG/1
TAKE 1 TABLET (40 MG) BY ORAL ROUTE ONCE DAILY TABLET, FILM COATED ORAL 1
Qty: 0 | Refills: 0 | Status: ACTIVE | COMMUNITY
Start: 1900-01-01

## 2024-02-02 RX ORDER — DICYCLOMINE HYDROCHLORIDE 10 MG/1
1 CAPSULE CAPSULE ORAL THREE TIMES A DAY
Qty: 270 CAPSULE | Refills: 6 | Status: ACTIVE | COMMUNITY

## 2024-02-02 RX ORDER — AMITRIPTYLINE HYDROCHLORIDE 25 MG/1
TAKE ONE TABLET BY MOUTH EVERY NIGHT AT BEDTIME TABLET, FILM COATED ORAL
Qty: 90 TABLET | Refills: 3 | Status: ACTIVE | COMMUNITY

## 2024-02-02 RX ORDER — ROSUVASTATIN CALCIUM 20 MG
TAKE 1 TABLET (20 MG) BY ORAL ROUTE ONCE DAILY TABLET ORAL 1
Qty: 0 | Refills: 0 | Status: ACTIVE | COMMUNITY
Start: 1900-01-01

## 2024-02-02 NOTE — HPI-TODAY'S VISIT:
Mrs. Clark presents for evaluation of perineal rash.  A month ago she developed an acute viral enteritis with nausea vomiting diarrhea.  She had some perineal irritation and vaginal yeast infection.  She was given an ointment by her gynecologist for the yeast infection and was told also to rub this on her perineum and perirectal area.  Over the past 2 weeks she has had progressive ulcerations of her perirectal area with bleeding.  This is sore to the touch and erythematous.  Her bowels have improved, she continues to take her Florastor daily and amitriptyline 25 mg nightly with a history of IBS-D.  Her diarrhea has resolved but this perineal inflammation and bleeding is persistent.  MB 7-2-21 The patient presents today for follow-up of her diarrhea and rash.  Since her last visit, her rash has improved.  Her diarrhea still continues about once every 5 days.  She will wake up almost every morning, and have a normal bowel movement.  On her bad days, she will then have a urgent diarrhea that last up to 10 times.  She will then take 2 Imodium, and then she will usually have normal bowel movements for the next 5 days.  She can usually feel when this sensation is going to come on.  She is taking the amitriptyline at night.  She has never tried dicyclomine.  At this point, we have discussed taking 1-2 dicyclomine every morning to see if this does not help control her diarrhea.  She likely has irritable bowel syndrome with diarrhea in that she did have negative random colon biopsies done in 2018.  She denies any blood in her stool or weight loss.  Overall, she is feeling better today. 11/5/2021 The patient presents today for follow-up of her diarrhea.  She has improved after taking dicyclomine 1:59 in the morning.  She is also on amitriptyline at night.  She has not added in Metamucil at this time.  She continues to have occasional episodes of explosive diarrhea after being more constipated for about 10 days.  At this point, I want her to continue her dicyclomine in the morning her amitriptyline at night, and add Metamucil at night as well.  Overall, she is doing quite well.  She is off medications for her reflux. 5/6/2022 The patient presents today for follow-up of her IBS with diarrhea.  Since her last visit, she has been doing well.  She takes dicyclomine twice a day and amitriptyline at night.  She also uses Imodium as needed.  On this regimen, her bowels are well controlled.  She has not added in Metamucil, but she is going to try to do this as well.  She is due for repeat colonoscopy next year given her family history of colon cancer.  We will see her back in the office in 1 year. 8/5/2022 the patient presents today for follow-up of her IBS with diarrhea.  Her main issue today, is that she has new onset dysphagia.  She states about 10 years ago, she had a similar problem.  She actually had a food bolus, and Dr. Centeno did an EGD with dilation and food bolus removal.  She has been taking 1 over-the-counter Prevacid since that time.  She has been having worsening symptoms of her reflux.  She feels burning and acid coming up into her esophagus throughout the day and at night.  Her diarrhea has been worse as well.  When she got her last prescription for dicyclomine, it was only for once a day.  She has had to take Imodium several times this week where she usually only has to take it once a month.  She did add in the Metamucil, but she is unsure if this is helped significantly.  Today, we have discussed switching her PPI to pantoprazole 40 mg daily.  We are going to schedule her for an EGD with dilation.  I want her to go back on her dicyclomine twice a day.  We will see her back in the office after her EGD for further evaluation.  5/12/23 Mrs. Clark returns to clinic for follow up of IBS with diarrhea and dysphagia. She states much improved on protonix, therfore she cancelled her EGD. She has no return of dysphagia.  Continues with 3-4 urgent bms on loose days takes imodium 1 x per week giving her a normal 1 per day BM but she needs to repeat again in a week.  Continues drinking wine daily. She is on Xarelto followed by Dr. Mendoza. States florastor didn't work, but agrees to increase to 2 fiber tablets from one SP 2/2/2024 Ms. Clark returns to clinic following her colonoscopy with Dr. Gong with biopsies negative for microscopic colitis.  She had no colon polyps and was only noted to have diverticulosis. She states she continues on dicyclomine, Metamucil and Imodium as needed.  She is doing well with her reflux symptoms controlled on pantoprazole 40 mg daily which also has improved any dysphagia symptoms.  She still has trouble if the pill is large and chalky. Overall she feels well manage from a GI standpoint would like to return in 1 year. ALLY

## 2024-05-10 ENCOUNTER — TELEPHONE ENCOUNTER (OUTPATIENT)
Dept: URBAN - NONMETROPOLITAN AREA CLINIC 13 | Facility: CLINIC | Age: 79
End: 2024-05-10

## 2024-05-10 ENCOUNTER — OFFICE VISIT (OUTPATIENT)
Dept: URBAN - NONMETROPOLITAN AREA CLINIC 13 | Facility: CLINIC | Age: 79
End: 2024-05-10

## 2024-05-10 RX ORDER — METOPROLOL SUCCINATE 200 MG
1 TABLET TABLET, EXTENDED RELEASE 24 HR ORAL ONCE A DAY
Status: ACTIVE | COMMUNITY

## 2024-05-10 RX ORDER — AMLODIPINE BESYLATE 2.5 MG/1
TAKE 1 TABLET (2.5 MG) BY ORAL ROUTE ONCE DAILY TABLET ORAL 1
Qty: 0 | Refills: 0 | Status: ACTIVE | COMMUNITY
Start: 1900-01-01

## 2024-05-10 RX ORDER — AMITRIPTYLINE HYDROCHLORIDE 25 MG/1
TAKE ONE TABLET BY MOUTH EVERY NIGHT AT BEDTIME TABLET, FILM COATED ORAL
Qty: 90 TABLET | Refills: 3 | Status: ACTIVE | COMMUNITY

## 2024-05-10 RX ORDER — PANTOPRAZOLE SODIUM 40 MG/1
1 TABLET TABLET, DELAYED RELEASE ORAL ONCE A DAY
Qty: 90 TABLET | Refills: 3

## 2024-05-10 RX ORDER — ROSUVASTATIN CALCIUM 20 MG
TAKE 1 TABLET (20 MG) BY ORAL ROUTE ONCE DAILY TABLET ORAL 1
Qty: 0 | Refills: 0 | Status: ACTIVE | COMMUNITY
Start: 1900-01-01

## 2024-05-10 RX ORDER — CETIRIZINE HYDROCHLORIDE 10 MG/1
TAKE 1 CAPSULE BY ORAL ROUTE ONCE CAPSULE, LIQUID FILLED ORAL 1
Qty: 0 | Refills: 0 | Status: ACTIVE | COMMUNITY
Start: 1900-01-01

## 2024-05-10 RX ORDER — PANTOPRAZOLE SODIUM 40 MG/1
1 TABLET TABLET, DELAYED RELEASE ORAL ONCE A DAY
Qty: 90 TABLET | Refills: 3 | Status: ACTIVE | COMMUNITY

## 2024-05-10 RX ORDER — SPIRONOLACTONE 50 MG/1
TAKE 1 TABLET (25 MG) BY ORAL ROUTE ONCE DAILY TABLET, FILM COATED ORAL 1
Qty: 0 | Refills: 0 | Status: ACTIVE | COMMUNITY
Start: 1900-01-01

## 2024-05-10 RX ORDER — BENAZEPRIL HYDROCHLORIDE 40 MG/1
TAKE 1 TABLET (40 MG) BY ORAL ROUTE ONCE DAILY TABLET, FILM COATED ORAL 1
Qty: 0 | Refills: 0 | Status: ACTIVE | COMMUNITY
Start: 1900-01-01

## 2024-05-10 RX ORDER — DICYCLOMINE HYDROCHLORIDE 10 MG/1
1 CAPSULE CAPSULE ORAL THREE TIMES A DAY
Qty: 270 CAPSULE | Refills: 6 | Status: ACTIVE | COMMUNITY

## 2024-05-18 ENCOUNTER — ERX REFILL RESPONSE (OUTPATIENT)
Dept: URBAN - NONMETROPOLITAN AREA CLINIC 2 | Facility: CLINIC | Age: 79
End: 2024-05-18

## 2024-05-18 RX ORDER — PANTOPRAZOLE 40 MG/1
TAKE ONE TABLET BY MOUTH DAILY TABLET, DELAYED RELEASE ORAL
Qty: 90 TABLET | Refills: 0 | OUTPATIENT

## 2024-05-18 RX ORDER — PANTOPRAZOLE SODIUM 40 MG/1
1 TABLET TABLET, DELAYED RELEASE ORAL ONCE A DAY
Qty: 90 TABLET | Refills: 3 | OUTPATIENT

## 2024-05-29 ENCOUNTER — ERX REFILL RESPONSE (OUTPATIENT)
Dept: URBAN - NONMETROPOLITAN AREA CLINIC 2 | Facility: CLINIC | Age: 79
End: 2024-05-29

## 2024-05-29 RX ORDER — DICYCLOMINE HYDROCHLORIDE 10 MG/1
1 CAPSULE CAPSULE ORAL THREE TIMES A DAY
Qty: 270 CAPSULE | Refills: 6 | OUTPATIENT

## 2024-08-20 ENCOUNTER — ERX REFILL RESPONSE (OUTPATIENT)
Dept: URBAN - NONMETROPOLITAN AREA CLINIC 2 | Facility: CLINIC | Age: 79
End: 2024-08-20

## 2024-08-20 RX ORDER — PANTOPRAZOLE 40 MG/1
TAKE ONE TABLET BY MOUTH DAILY TABLET, DELAYED RELEASE ORAL
Qty: 90 TABLET | Refills: 0 | OUTPATIENT

## 2024-08-20 RX ORDER — PANTOPRAZOLE 40 MG/1
TAKE 1 TABLET BY MOUTH DAILY TABLET, DELAYED RELEASE ORAL
Qty: 90 TABLET | Refills: 3 | OUTPATIENT

## 2025-01-13 ENCOUNTER — ERX REFILL RESPONSE (OUTPATIENT)
Dept: URBAN - NONMETROPOLITAN AREA CLINIC 2 | Facility: CLINIC | Age: 80
End: 2025-01-13

## 2025-01-13 RX ORDER — AMITRIPTYLINE HYDROCHLORIDE 25 MG/1
TAKE ONE TABLET BY MOUTH EVERY NIGHT AT BEDTIME TABLET, FILM COATED ORAL
Qty: 90 TABLET | Refills: 3 | OUTPATIENT

## 2025-01-17 ENCOUNTER — P2P PATIENT RECORD (OUTPATIENT)
Age: 80
End: 2025-01-17

## 2025-02-07 ENCOUNTER — OFFICE VISIT (OUTPATIENT)
Dept: URBAN - NONMETROPOLITAN AREA CLINIC 13 | Facility: CLINIC | Age: 80
End: 2025-02-07